# Patient Record
Sex: MALE | Race: WHITE | NOT HISPANIC OR LATINO | Employment: FULL TIME | URBAN - METROPOLITAN AREA
[De-identification: names, ages, dates, MRNs, and addresses within clinical notes are randomized per-mention and may not be internally consistent; named-entity substitution may affect disease eponyms.]

---

## 2018-03-19 ENCOUNTER — OFFICE VISIT (OUTPATIENT)
Dept: URGENT CARE | Facility: PHYSICIAN GROUP | Age: 28
End: 2018-03-19
Payer: COMMERCIAL

## 2018-03-19 VITALS
OXYGEN SATURATION: 97 % | TEMPERATURE: 98.9 F | HEIGHT: 76 IN | HEART RATE: 61 BPM | DIASTOLIC BLOOD PRESSURE: 66 MMHG | BODY MASS INDEX: 28.49 KG/M2 | SYSTOLIC BLOOD PRESSURE: 104 MMHG | WEIGHT: 234 LBS

## 2018-03-19 DIAGNOSIS — N48.89: ICD-10-CM

## 2018-03-19 PROCEDURE — 99213 OFFICE O/P EST LOW 20 MIN: CPT | Performed by: NURSE PRACTITIONER

## 2018-03-19 ASSESSMENT — ENCOUNTER SYMPTOMS
VOMITING: 0
MYALGIAS: 0
NAUSEA: 0
CHILLS: 0
FEVER: 0
HEADACHES: 0

## 2018-03-19 NOTE — PROGRESS NOTES
"Subjective:      Shiv Espino is a 27 y.o. male who presents with Penis Pain (Penis swelling and discomfort that started this morning. Pt states he was very sexually active over the weekend, woke up this morning with swelling just below the head of his penis)            Medications, Allergies and Prior Medical Hx reviewed and updated in [facility].with patient/family today     Pt c/o painless swelling to the distal end of his penis onset today. Pt states had frequent sex over the weekend with his long term female partner.  He denies any pain, discharge, open lesions, redness.          Review of Systems   Constitutional: Negative for chills and fever.   Gastrointestinal: Negative for nausea and vomiting.   Genitourinary: Negative for dysuria, frequency, hematuria and urgency.   Musculoskeletal: Negative for myalgias.   Neurological: Negative for headaches.          Objective:     /66   Pulse 61   Temp 37.2 °C (98.9 °F)   Ht 1.93 m (6' 4\")   Wt 106.1 kg (234 lb)   SpO2 97%   BMI 28.48 kg/m²      Physical Exam   Constitutional: He appears well-developed and well-nourished. No distress.   HENT:   Head: Normocephalic and atraumatic.   Eyes: Conjunctivae are normal. Pupils are equal, round, and reactive to light.   Neck: Neck supple.   Cardiovascular: Normal rate.    Pulmonary/Chest: Effort normal. No respiratory distress.   Genitourinary: Circumcised. No phimosis, paraphimosis or penile erythema. No discharge found.         Genitourinary Comments: Pt has small amt of discharge to the right side of his penis just behind the glans. There is no ttp, no erythema, no induration, no open lesions, no ecchymosis.    Neurological: He is alert.   Awake, alert, answering questions appropriately, moving all extremeties   Skin: Skin is warm and dry. No rash noted.   Psychiatric: He has a normal mood and affect. His behavior is normal.   Vitals reviewed.              Assessment/Plan:     1. Edema, penis   "       Rest  Pt will go to the ER for worsening or changing symptoms as discussed, redness, worsening swelling, pain, discharge, open sores or any other concerns  Follow-up with your primary care provider or return here if not improving in 2 days   Discharge instructions discussed with pt/family who verbalize understanding and agreement with poc

## 2021-12-05 ENCOUNTER — NON-PROVIDER VISIT (OUTPATIENT)
Dept: URGENT CARE | Facility: PHYSICIAN GROUP | Age: 31
End: 2021-12-05

## 2021-12-05 DIAGNOSIS — Z02.1 PRE-EMPLOYMENT DRUG SCREENING: Primary | ICD-10-CM

## 2021-12-05 LAB
AMP AMPHETAMINE: NORMAL
COC COCAINE: NORMAL
INT CON NEG: NORMAL
INT CON POS: NORMAL
MET METHAMPHETAMINES: NORMAL
OPI OPIATES: NORMAL
PCP PHENCYCLIDINE: NORMAL
POC DRUG COMMENT 753798-OCCUPATIONAL HEALTH: NEGATIVE
THC: NORMAL

## 2021-12-05 PROCEDURE — 80305 DRUG TEST PRSMV DIR OPT OBS: CPT | Performed by: STUDENT IN AN ORGANIZED HEALTH CARE EDUCATION/TRAINING PROGRAM
